# Patient Record
(demographics unavailable — no encounter records)

---

## 2024-11-11 NOTE — HISTORY OF PRESENT ILLNESS
[Constipation] : constipation [Premenarchal] : premenarchal [Headaches] : no headaches [Visual Symptoms] : no ~T visual symptoms [Cold Intolerance] : no cold intolerance [Fatigue] : no fatigue [Weakness] : no weakness [Abdominal Pain] : no abdominal pain [Nausea] : no nausea [Vomiting] : no vomiting [FreeTextEntry2] :  Peri is a 8 [id number]-edn little girl with idiopathic short stature presents for follow-up  On review of medical history, she was born full-term ex 36-week AGA baby girl at 6 pounds, length unknown.  At initial visit in September 2023, we reviewed growth chart from pediatrician on mom's phone noting linear growth at 5 to 6 years of age below the 1st percentile unfortunately, growth charts prior to this are unavailable although mom recalls that she was in the 10th percentile is a 3-year-old. Height at initial visit was noted in the 1st percentile with BMI in the 19th percentile.  After initial visit, screening labs were recommended and obtained. Celiac, TFTs, LFTS, cbc wnl. IGF1 low normal. Karyotype 46 XX, no concern for Maurice syndrome. Bone age read by me as 7 years 10 months at chronological age 7 years 5 months. Height prediction 56.2. Given poor height prediction decision to pursue growth hormone stimulation test was made. Peak of growth hormone stimulation test was noted to be 15.1 ng/mL, not consistent with growth hormone deficiency.  Growth hormone was denied multiple times over the past few months. Peri present today ready to start growth hormone in the setting of recent approval.  She continues to grow in the 1st percentile with growth velocity of 5.88 cm/year, annualized since last visit in summer 2024.  She has also gained 4 pounds.  Mom notes she is making a stronger effort to eat and also notes that she had lost weight directly before last visit in the setting of recent pneumonia.  On review of systems, she feels well and denies any systemic complaints. Denies any signs of puberty. Mom's height 64.5 inches Dad's height 66.5 inches Paternal great great grandmother less than 5 feet Paternal great uncle 62 inches Maternal great great grandfather 64 inches.

## 2024-11-11 NOTE — ASSESSMENT
[FreeTextEntry1] :  Peri is a 8 year 7-month-old little girl with idiopathic short stature presents for follow-up Per FDA criteria, we recommend initiation of growth hormone therapy with omnitrope 0.9 ( 0.3 mgkg/week)  mg subcutaneously, daily.  Continue to injures lark intake.  Will obtain bone age to assess hyperadduction

## 2024-11-11 NOTE — PHYSICAL EXAM
[Healthy Appearing] : healthy appearing [Normal S1 and S2] : normal S1 and S2 [Clear to Ausculation Bilaterally] : clear to auscultation bilaterally [Abdomen Soft] : soft [Abdomen Tenderness] : non-tender [1] : was Joselo stage 1 [Normal Appearance] : normal in appearance [Joselo Stage ___] : the Joselo stage for breast development was [unfilled] [Normal] : normal  [Murmur] : no murmurs [Mild Diffuse Bilateral Wheezing] : no mild diffuse wheezing [Upper Airway Sounds] : no upper airway sounds transmitted [de-identified] : Joselo I [de-identified] : grossly normal

## 2024-11-11 NOTE — CONSULT LETTER
[Dear  ___] : Dear  [unfilled], [Consult Letter:] : I had the pleasure of evaluating your patient, [unfilled]. [Please see my note below.] : Please see my note below. [Consult Closing:] : Thank you very much for allowing me to participate in the care of this patient.  If you have any questions, please do not hesitate to contact me. [Sincerely,] : Sincerely, [FreeTextEntry3] : Jessica Hernandez MD  [facility] Physician [facility] Division of Pediatric Endocrinology P: [phone] F: [phone]

## 2024-11-11 NOTE — HISTORY OF PRESENT ILLNESS
[Constipation] : constipation [Premenarchal] : premenarchal [Headaches] : no headaches [Visual Symptoms] : no ~T visual symptoms [Cold Intolerance] : no cold intolerance [Fatigue] : no fatigue [Weakness] : no weakness [Abdominal Pain] : no abdominal pain [Nausea] : no nausea [Vomiting] : no vomiting [FreeTextEntry2] :  Peri is a 8 [id number]-cge little girl with idiopathic short stature presents for follow-up  On review of medical history, she was born full-term ex 36-week AGA baby girl at 6 pounds, length unknown.  At initial visit in September 2023, we reviewed growth chart from pediatrician on mom's phone noting linear growth at 5 to 6 years of age below the 1st percentile unfortunately, growth charts prior to this are unavailable although mom recalls that she was in the 10th percentile is a 3-year-old. Height at initial visit was noted in the 1st percentile with BMI in the 19th percentile.  After initial visit, screening labs were recommended and obtained. Celiac, TFTs, LFTS, cbc wnl. IGF1 low normal. Karyotype 46 XX, no concern for Maurice syndrome. Bone age read by me as 7 years 10 months at chronological age 7 years 5 months. Height prediction 56.2. Given poor height prediction decision to pursue growth hormone stimulation test was made. Peak of growth hormone stimulation test was noted to be 15.1 ng/mL, not consistent with growth hormone deficiency.  Growth hormone was denied multiple times over the past few months. Peri present today ready to start growth hormone in the setting of recent approval.  She continues to grow in the 1st percentile with growth velocity of 5.88 cm/year, annualized since last visit in summer 2024.  She has also gained 4 pounds.  Mom notes she is making a stronger effort to eat and also notes that she had lost weight directly before last visit in the setting of recent pneumonia.  On review of systems, she feels well and denies any systemic complaints. Denies any signs of puberty. Mom's height 64.5 inches Dad's height 66.5 inches Paternal great great grandmother less than 5 feet Paternal great uncle 62 inches Maternal great great grandfather 64 inches.

## 2024-11-11 NOTE — PHYSICAL EXAM
[Healthy Appearing] : healthy appearing [Normal S1 and S2] : normal S1 and S2 [Clear to Ausculation Bilaterally] : clear to auscultation bilaterally [Abdomen Soft] : soft [Abdomen Tenderness] : non-tender [1] : was Joselo stage 1 [Normal Appearance] : normal in appearance [Joselo Stage ___] : the Joselo stage for breast development was [unfilled] [Normal] : normal  [Murmur] : no murmurs [Mild Diffuse Bilateral Wheezing] : no mild diffuse wheezing [Upper Airway Sounds] : no upper airway sounds transmitted [de-identified] : Joselo I [de-identified] : grossly normal

## 2024-11-11 NOTE — CONSULT LETTER
[Dear  ___] : Dear  [unfilled], [Consult Letter:] : I had the pleasure of evaluating your patient, [unfilled]. [Please see my note below.] : Please see my note below. [Consult Closing:] : Thank you very much for allowing me to participate in the care of this patient.  If you have any questions, please do not hesitate to contact me. [Sincerely,] : Sincerely, [FreeTextEntry3] : Jessica Hernandez MD  [facility] Physician [facility] Division of Pediatric Endocrinology P: [phone] F: [phone]

## 2025-03-21 NOTE — PHYSICAL EXAM
[Healthy Appearing] : healthy appearing [Normal S1 and S2] : normal S1 and S2 [Clear to Ausculation Bilaterally] : clear to auscultation bilaterally [Abdomen Soft] : soft [Abdomen Tenderness] : non-tender [Normal] : normal  [Murmur] : no murmurs [Mild Diffuse Bilateral Wheezing] : no mild diffuse wheezing [Upper Airway Sounds] : no upper airway sounds transmitted [1] : was Joselo stage 1 [Normal Appearance] : normal in appearance [Joselo Stage ___] : the Joselo stage for breast development was [unfilled] [de-identified] : Subareolar breast tissue, left no breast tissue, right [de-identified] : grossly normal [de-identified] : Joselo I

## 2025-03-21 NOTE — ASSESSMENT
[FreeTextEntry1] :  Peri is a 8 year [id number] little girl with idiopathic short stature presents for follow-up Will weight adjust growth hormone to 1.0 mg ( 0.30 mg/kg/week) .   Will obtain labs including IGF-I, IGFBP-3, TSH, free T4, hemoglobin A1c. Will also obtain LH, FSH, estradiol to understand pubertal development Will also obtain interval bone age to assess height prediction.

## 2025-03-21 NOTE — CONSULT LETTER
[Dear  ___] : Dear  [unfilled], [Consult Letter:] : I had the pleasure of evaluating your patient, [unfilled]. [Please see my note below.] : Please see my note below. [Consult Closing:] : Thank you very much for allowing me to participate in the care of this patient.  If you have any questions, please do not hesitate to contact me. [Sincerely,] : Sincerely, [FreeTextEntry3] : Jessica Hernandez MD   Physician [facility] Division of Pediatric Endocrinology P: [phone] F: [phone]

## 2025-03-21 NOTE — HISTORY OF PRESENT ILLNESS
[Constipation] : constipation [Premenarchal] : premenarchal [Headaches] : no headaches [Visual Symptoms] : no ~T visual symptoms [Cold Intolerance] : no cold intolerance [Fatigue] : no fatigue [Weakness] : no weakness [Abdominal Pain] : no abdominal pain [Nausea] : no nausea [Vomiting] : no vomiting [FreeTextEntry2] :  Peri is a 8 year [id number]-exk little girl with idiopathic short stature presents for follow-up  On review of medical history, she was born full-term ex 36-week AGA baby girl at 6 pounds, length unknown.  Growth hormone started in January 2025  At initial visit in September 2023, we reviewed growth chart from pediatrician on mom's phone noting linear growth at 5 to 6 years of age below the 1st percentile unfortunately, growth charts prior to this are unavailable although mom recalls that she was in the 10th percentile is a 3-year-old. Height at initial visit was noted in the 1st percentile with BMI in the 19th percentile.  After initial visit, screening labs were recommended and obtained. Celiac, TFTs, LFTS, cbc wnl. IGF1 low normal. Karyotype 46 XX, no concern for Maurice syndrome. Bone age read by me as 7 years 10 months at chronological age 7 years 5 months. Height prediction 56.2. Given poor height prediction decision to pursue growth hormone stimulation test was made. Peak of growth hormone stimulation test was noted to be 15.1 ng/mL, not consistent with growth hormone deficiency.  Since starting growth hormone, mom notes Lesli has been growing nicely and has been much more hungry.  Since her last visit, she has gained 5 pounds.  Linear growth continues in the 1st percentile with an annualized growth velocity of 8.14 cm/year.  She is tolerating injections okay.  Mom is injecting 0.9 mg (0.30 mg/kg.  Based on last visits weight and 0.27 mg/kg/week based on today's weight).  Peri denies any polyuria, polydipsia, headache, blurry vision, knee pain, hip pain.  She feels well.  She does complain of mild left-sided nipple pain.  On review of systems, she feels well and denies any systemic complaints. Denies any signs of puberty. Mom's height 64.5 inches Dad's height 66.5 inches Paternal great great grandmother less than 5 feet Paternal great uncle 62 inches Maternal great great grandfather 64 inches.

## 2025-07-23 NOTE — PHYSICAL EXAM
[Healthy Appearing] : healthy appearing [Normal S1 and S2] : normal S1 and S2 [Clear to Ausculation Bilaterally] : clear to auscultation bilaterally [Abdomen Soft] : soft [Abdomen Tenderness] : non-tender [1] : was Joselo stage 1 [Normal Appearance] : normal in appearance [Joselo Stage ___] : the Joselo stage for breast development was [unfilled] [Normal] : normal  [Murmur] : no murmurs [Mild Diffuse Bilateral Wheezing] : no mild diffuse wheezing [Upper Airway Sounds] : no upper airway sounds transmitted [de-identified] : Joselo I breast tissue [de-identified] : Joselo I [de-identified] : grossly normal

## 2025-07-23 NOTE — HISTORY OF PRESENT ILLNESS
[Constipation] : constipation [Premenarchal] : premenarchal [Headaches] : no headaches [Visual Symptoms] : no ~T visual symptoms [Cold Intolerance] : no cold intolerance [Fatigue] : no fatigue [Weakness] : no weakness [Abdominal Pain] : no abdominal pain [Nausea] : no nausea [Vomiting] : no vomiting [FreeTextEntry2] :  Peri is a 9 years 3 month-old little girl with idiopathic short stature presents for follow-up  On review of medical history, she was born full-term ex 36-week AGA baby girl at 6 pounds, length unknown.  Growth hormone started in January 2025  At initial visit in September 2023, we reviewed growth chart from pediatrician on mom's phone noting linear growth at 5 to 6 years of age below the 1st percentile unfortunately, growth charts prior to this are unavailable although mom recalls that she was in the 10th percentile is a 3-year-old. Height at initial visit was noted in the 1st percentile with BMI in the 19th percentile.  After initial visit, screening labs were recommended and obtained. Celiac, TFTs, LFTS, cbc wnl. IGF1 low normal. Karyotype 46 XX, no concern for Maurice syndrome. Bone age read by me as 7 years 10 months at chronological age 7 years 5 months. Height prediction 56.2. Given poor height prediction decision to pursue growth hormone stimulation test was made. Peak of growth hormone stimulation test was noted to be 15.1 ng/mL, not consistent with growth hormone deficiency. In March 2025, GH was optimized from 0.9 to 1.0 mg ( 0.30 mglg/week based on last visits weight(   At last visit , she complained of left nipple pain. LH. FSH ,estradiol was prepubertal.  Bone age in March 2024 read as 7 years 10 months, no progression from last, HP 60.4 +/- 2 inches.  She is tolerating injection at 1,0 mg daily .Peri denies any polyuria, polydipsia, headache, blurry vision, knee pain, hip pain.   Review of growth chart reveals linear growth in the second percent, up from 1st percentile last visit.  Growth velocity, annualized since last visit 6.23 cm/year.  She has gained 1 pound and BMI stable in the 37 percentile. On review of systems, she feels well and denies any systemic complaints. Denies any further signs of puberty. Mom's height 64.5 inches Dad's height 66.5 inches Paternal great great grandmother less than 5 feet Paternal great uncle 62 inches Maternal great great grandfather 64 inches.

## 2025-07-23 NOTE — ASSESSMENT
[FreeTextEntry1] : Lesli is a 9-year 3-month-old with growth hormone deficiency who presents for follow-up.   Will weight adjust growth hormone to 1.1 mg (0.33 mg/kg/week.  Will also obtain labs including IGF-I, IGFBP-3, TSH, free T4, hemoglobin A1c.  If labs are within normal limits, can consider further adjusting growth hormone to 1.2 mg (0.35 mg/kg/week.  With regard to concern for early puberty, subareolar breast tissue was noted at last visit but not noted today.  In the setting of nonadvanced bone age and prepubertal labs in March 2025.  I am no longer concern for any signs of puberty.  Next bone age due March 2026.

## 2025-07-23 NOTE — PHYSICAL EXAM
[Healthy Appearing] : healthy appearing [Normal S1 and S2] : normal S1 and S2 [Clear to Ausculation Bilaterally] : clear to auscultation bilaterally [Abdomen Soft] : soft [Abdomen Tenderness] : non-tender [1] : was Joselo stage 1 [Normal Appearance] : normal in appearance [Joselo Stage ___] : the Joselo stage for breast development was [unfilled] [Normal] : normal  [Murmur] : no murmurs [Mild Diffuse Bilateral Wheezing] : no mild diffuse wheezing [Upper Airway Sounds] : no upper airway sounds transmitted [de-identified] : Joselo I breast tissue [de-identified] : Joselo I [de-identified] : grossly normal

## 2025-07-23 NOTE — CONSULT LETTER
[Dear  ___] : Dear  [unfilled], [Consult Letter:] : I had the pleasure of evaluating your patient, [unfilled]. [Please see my note below.] : Please see my note below. [Consult Closing:] : Thank you very much for allowing me to participate in the care of this patient.  If you have any questions, please do not hesitate to contact me. [Sincerely,] : Sincerely, [FreeTextEntry3] : Jessica Hernandez MD  [facility] Physician [facility] Division of Pediatric Endocrinology P: [phone] F: [phone]

## 2025-07-23 NOTE — CONSULT LETTER
[Dear  ___] : Dear  [unfilled], [Consult Letter:] : I had the pleasure of evaluating your patient, [unfilled]. [Please see my note below.] : Please see my note below. [Consult Closing:] : Thank you very much for allowing me to participate in the care of this patient.  If you have any questions, please do not hesitate to contact me. [Sincerely,] : Sincerely, [FreeTextEntry3] : Jessica Hernandez MD  [facility] Physician [facility] Division of Pediatric Endocrinology P: [phone] F: [phone]